# Patient Record
(demographics unavailable — no encounter records)

---

## 2025-02-12 NOTE — DATA REVIEWED
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 - Likely associated osseous erosions concerning for underlying inflammatory etiology. Correlate with serum inflammatory markers. Consider folow-up/repeat imaging within 6 months. - Small left glenohumeral joint effusion. - Mild bilateral long head biceps tenosynovitis.

## 2025-02-12 NOTE — END OF VISIT
[FreeTextEntry3] : All medical record entries made by the Scribe were at my, Dr. Franki Landry, direction and personally dictated by me on 02/12/2025. I have reviewed the chart and agree that the record accurately reflects my personal performance of the history, physical exam, assessment and plan. I have also personally directed, reviewed, and agreed with the chart.

## 2025-02-12 NOTE — DATA REVIEWED
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 - Likely associated osseous erosions concerning for underlying inflammatory etiology. Correlate with serum inflammatory markers. Consider folow-up/repeat imaging within 6 months. - Small left glenohumeral joint effusion. - Mild bilateral long head biceps tenosynovitis.

## 2025-02-12 NOTE — ADDENDUM
[FreeTextEntry1] : I, Alex Nagy, documented this note as a scribe on behalf of Dr. Franki Landry on 02/12/2025.

## 2025-02-12 NOTE — REVIEW OF SYSTEMS
[Arthralgias] : arthralgias [Joint Pain] : joint pain [Joint Stiffness] : joint stiffness [Joint Swelling] : joint swelling [Nl] : Hematologic/Lymphatic

## 2025-02-12 NOTE — DISCUSSION/SUMMARY
[All Questions Answered] : Patient (and family) had all questions answered to an agreeable level of satisfaction [Interested in Proceeding] : Patient (and family) expressed understanding and interest in proceeding with the plan as outlined [de-identified] : Likely degenerative changes related to possible inflammatory arthritis of the SC joints. I again have recommended that he investigate the MRI findings further with rheumatology workup for inflammatory arthritis. We discussed that there does not appear to be anything to be done from an orthopedic oncology perspective. I can see him again as needed.  If imaging or pathology/biopsy was ordered, the patient was told to make an appointment to review findings right after all imaging is completed.   We discussed risks, benefits and alternatives. Rationale of care was reviewed and all questions were answered.

## 2025-02-12 NOTE — PHYSICAL EXAM
[General Appearance - Well-Appearing] : Well appearing [General Appearance - Well Nourished] : well nourished [Oriented To Time, Place, And Person] : Oriented to person, place, and time [Sclera] : the sclera and conjunctiva were normal [Neck Cervical Mass (___cm)] : no neck mass was observed [Heart Rate And Rhythm] : heart rate was normal and rhythm regular [] : No respiratory distress [Abdomen Soft] : Soft [Normal Station and Gait] : gait and station were normal [FreeTextEntry1] : On exam he has swelling and tenderness at the right > left SC joints. There is no erythema. He has pain with palpation and pain with ROM, especially when cross-abducting at the SC joint. He is neurovascularly intact. There is no palpable lymphadenopathy.

## 2025-02-12 NOTE — HISTORY OF PRESENT ILLNESS
[Worsening] : worsening [4] : currently ~his/her~ pain is 4 out of 10 [Direct Pressure] : worsened by direct pressure [Joint Movement] : worsened by joint movement [FreeTextEntry1] : This is a 62 year old man who has been swelling and pain of bilateral SC joints in his sternum. He had an MRI scan and was advised by me to see rheumatology for further evaluation. Since then, he only saw his medical doctor, got some blood work, and presents today for further evaluation. The pain has been present for a few months. It bothers him when he moves his shoulders cross-body. He has had shoulder pain in this area for some time that is now worsening. He also has pain in his sternum just below this as well as pain in the neck just below the lymph nodes. He has no fevers, chills, or other constitutional symptoms.

## 2025-02-12 NOTE — DISCUSSION/SUMMARY
[All Questions Answered] : Patient (and family) had all questions answered to an agreeable level of satisfaction [Interested in Proceeding] : Patient (and family) expressed understanding and interest in proceeding with the plan as outlined [de-identified] : Likely degenerative changes related to possible inflammatory arthritis of the SC joints. I again have recommended that he investigate the MRI findings further with rheumatology workup for inflammatory arthritis. We discussed that there does not appear to be anything to be done from an orthopedic oncology perspective. I can see him again as needed.  If imaging or pathology/biopsy was ordered, the patient was told to make an appointment to review findings right after all imaging is completed.   We discussed risks, benefits and alternatives. Rationale of care was reviewed and all questions were answered.